# Patient Record
Sex: MALE | Race: WHITE | NOT HISPANIC OR LATINO
[De-identification: names, ages, dates, MRNs, and addresses within clinical notes are randomized per-mention and may not be internally consistent; named-entity substitution may affect disease eponyms.]

---

## 2017-07-16 ENCOUNTER — TRANSCRIPTION ENCOUNTER (OUTPATIENT)
Age: 27
End: 2017-07-16

## 2017-08-16 ENCOUNTER — TRANSCRIPTION ENCOUNTER (OUTPATIENT)
Age: 27
End: 2017-08-16

## 2017-08-25 ENCOUNTER — TRANSCRIPTION ENCOUNTER (OUTPATIENT)
Age: 27
End: 2017-08-25

## 2018-03-01 ENCOUNTER — OUTPATIENT (OUTPATIENT)
Dept: OUTPATIENT SERVICES | Facility: HOSPITAL | Age: 28
LOS: 1 days | End: 2018-03-01

## 2018-03-21 DIAGNOSIS — R69 ILLNESS, UNSPECIFIED: ICD-10-CM

## 2018-05-17 ENCOUNTER — TRANSCRIPTION ENCOUNTER (OUTPATIENT)
Age: 28
End: 2018-05-17

## 2018-05-23 ENCOUNTER — APPOINTMENT (OUTPATIENT)
Dept: NEUROLOGY | Facility: CLINIC | Age: 28
End: 2018-05-23
Payer: MEDICAID

## 2018-05-23 VITALS
DIASTOLIC BLOOD PRESSURE: 78 MMHG | SYSTOLIC BLOOD PRESSURE: 120 MMHG | WEIGHT: 150 LBS | BODY MASS INDEX: 24.11 KG/M2 | HEIGHT: 66 IN

## 2018-05-23 DIAGNOSIS — Z86.59 PERSONAL HISTORY OF OTHER MENTAL AND BEHAVIORAL DISORDERS: ICD-10-CM

## 2018-05-23 DIAGNOSIS — M54.16 RADICULOPATHY, LUMBAR REGION: ICD-10-CM

## 2018-05-23 DIAGNOSIS — M54.12 RADICULOPATHY, CERVICAL REGION: ICD-10-CM

## 2018-05-23 PROCEDURE — 99204 OFFICE O/P NEW MOD 45 MIN: CPT

## 2018-05-23 RX ORDER — DEXTROAMPHETAMINE SACCHARATE, AMPHETAMINE ASPARTATE, DEXTROAMPHETAMINE SULFATE AND AMPHETAMINE SULFATE 7.5; 7.5; 7.5; 7.5 MG/1; MG/1; MG/1; MG/1
30 TABLET ORAL
Qty: 90 | Refills: 0 | Status: ACTIVE | COMMUNITY
Start: 2018-01-20

## 2018-05-23 RX ORDER — ALPRAZOLAM 2 MG/1
2 TABLET ORAL
Qty: 90 | Refills: 0 | Status: ACTIVE | COMMUNITY
Start: 2018-03-01

## 2018-06-01 ENCOUNTER — FORM ENCOUNTER (OUTPATIENT)
Age: 28
End: 2018-06-01

## 2018-06-02 ENCOUNTER — APPOINTMENT (OUTPATIENT)
Dept: MRI IMAGING | Facility: CLINIC | Age: 28
End: 2018-06-02
Payer: MEDICAID

## 2018-06-02 ENCOUNTER — OUTPATIENT (OUTPATIENT)
Dept: OUTPATIENT SERVICES | Facility: HOSPITAL | Age: 28
LOS: 1 days | End: 2018-06-02
Payer: MEDICAID

## 2018-06-02 DIAGNOSIS — M54.12 RADICULOPATHY, CERVICAL REGION: ICD-10-CM

## 2018-06-02 PROCEDURE — 72148 MRI LUMBAR SPINE W/O DYE: CPT

## 2018-06-02 PROCEDURE — 72148 MRI LUMBAR SPINE W/O DYE: CPT | Mod: 26

## 2018-06-02 PROCEDURE — 72141 MRI NECK SPINE W/O DYE: CPT | Mod: 26

## 2018-06-02 PROCEDURE — 72141 MRI NECK SPINE W/O DYE: CPT

## 2018-06-28 ENCOUNTER — APPOINTMENT (OUTPATIENT)
Dept: NEUROLOGY | Facility: CLINIC | Age: 28
End: 2018-06-28
Payer: MEDICAID

## 2018-06-28 PROCEDURE — 95886 MUSC TEST DONE W/N TEST COMP: CPT

## 2018-06-28 PROCEDURE — 95911 NRV CNDJ TEST 9-10 STUDIES: CPT

## 2018-10-01 ENCOUNTER — OUTPATIENT (OUTPATIENT)
Dept: OUTPATIENT SERVICES | Facility: HOSPITAL | Age: 28
LOS: 1 days | End: 2018-10-01
Payer: MEDICAID

## 2018-10-01 DIAGNOSIS — Z76.89 PERSONS ENCOUNTERING HEALTH SERVICES IN OTHER SPECIFIED CIRCUMSTANCES: ICD-10-CM

## 2018-10-08 DIAGNOSIS — Z71.89 OTHER SPECIFIED COUNSELING: ICD-10-CM

## 2019-03-20 ENCOUNTER — EMERGENCY (EMERGENCY)
Facility: HOSPITAL | Age: 29
LOS: 1 days | Discharge: LEFT BEFORE DISCHARGE | End: 2019-03-20
Attending: EMERGENCY MEDICINE
Payer: MEDICAID

## 2019-03-20 VITALS
WEIGHT: 149.91 LBS | HEIGHT: 65 IN | DIASTOLIC BLOOD PRESSURE: 84 MMHG | RESPIRATION RATE: 23 BRPM | TEMPERATURE: 98 F | SYSTOLIC BLOOD PRESSURE: 173 MMHG | HEART RATE: 134 BPM | OXYGEN SATURATION: 97 %

## 2019-03-20 PROCEDURE — 93005 ELECTROCARDIOGRAM TRACING: CPT

## 2019-03-20 PROCEDURE — 93010 ELECTROCARDIOGRAM REPORT: CPT

## 2019-03-20 RX ORDER — SODIUM CHLORIDE 9 MG/ML
1000 INJECTION INTRAMUSCULAR; INTRAVENOUS; SUBCUTANEOUS ONCE
Qty: 0 | Refills: 0 | Status: DISCONTINUED | OUTPATIENT
Start: 2019-03-20 | End: 2019-03-25

## 2019-03-20 NOTE — ED STATDOCS - OBJECTIVE STATEMENT
30 y/o M pt with PMHx anxiety presents to the ED c/o palpitations beginning this morning.  He states he feels anxious, his heart is racing, and he is "freaking out".  Pt's medication was recently changed, he has been taking xanax 2mg TID for the past 3 days, took the last dosage last night at 19:30, and took his first dosage of clonazepam 2mg before breakfast this morning.  He follows up with PMD and psychiatrist, is unsure why is med was changed.  He saw his PMD this morning, was advised to come into the ED for further evaluation.  Denies CP.  No further acute complaints at this time.  Psychiatrist: Dr. Lata Verdugo. PMD: Dr. Hare.  Pt left piror to completion of evaluation, decided to sent pt to the Main ED for cardiac monitoring, pt states he does not want to stay for blood work or for MD to speak to PMD, stating he will "take the risk of dying". 28 y/o M pt with PMHx anxiety presents to the ED c/o palpitations beginning this morning.  He states he feels anxious, his heart is racing, and he is "freaking out".  Pt's medication was recently changed, he has been taking xanax 2mg TID for the past 3 days, took the last dosage last night at 19:30, and took his first dosage of clonazepam 2mg before breakfast this morning.  He follows up with PMD and psychiatrist, saw his PCP for refill, but was changed to klonopin and  is unsure why is med was changed.  He saw his PMD this morning, was advised to come into the ED for further evaluation.  Denies CP.  No further acute complaints at this time.  Psychiatrist: Dr. Lata Verdugo. PMD: Dr. Hare.  Pt advised that due to elevated HR and symptoms, pt would need labs, cardiac monitoring, however he left prior to completion of evaluation, pt states he does not want to stay for blood work or for MD to speak to PMD, only wants renewed prescription of xanax now as he has to go to work,  stating he will "take the risk of dying". Patient walked out.

## 2019-03-20 NOTE — ED ADULT NURSE REASSESSMENT NOTE - NS ED NURSE REASSESS COMMENT FT1
Patient ambulated out of the room and states "if they aren't going to give me something to calm me down then I would rather risk death. I have to go to work." Patient ambulated out of the ED

## 2019-03-20 NOTE — ED ADULT TRIAGE NOTE - CHIEF COMPLAINT QUOTE
Patient states that he woke up this morning with palpitations. Patient states that he took his new medication for anxiety clonazepam but it is not helping. He denies any SOB, chest pain or recent travel. patient states that he is very anxious

## 2019-06-01 PROCEDURE — G9005: CPT

## 2021-04-05 ENCOUNTER — EMERGENCY (EMERGENCY)
Facility: HOSPITAL | Age: 31
LOS: 1 days | Discharge: HOME | End: 2021-04-05
Attending: EMERGENCY MEDICINE
Payer: MEDICAID

## 2021-04-05 VITALS
TEMPERATURE: 98 F | HEART RATE: 87 BPM | DIASTOLIC BLOOD PRESSURE: 83 MMHG | OXYGEN SATURATION: 96 % | RESPIRATION RATE: 18 BRPM | HEIGHT: 65 IN | SYSTOLIC BLOOD PRESSURE: 116 MMHG | WEIGHT: 149.91 LBS

## 2021-04-05 PROCEDURE — 99283 EMERGENCY DEPT VISIT LOW MDM: CPT

## 2021-04-05 PROCEDURE — 99284 EMERGENCY DEPT VISIT MOD MDM: CPT

## 2021-04-05 RX ORDER — ONDANSETRON 8 MG/1
4 TABLET, FILM COATED ORAL ONCE
Refills: 0 | Status: COMPLETED | OUTPATIENT
Start: 2021-04-05 | End: 2021-04-05

## 2021-04-05 RX ORDER — ONDANSETRON 8 MG/1
1 TABLET, FILM COATED ORAL
Qty: 15 | Refills: 0
Start: 2021-04-05 | End: 2021-04-09

## 2021-04-05 RX ADMIN — ONDANSETRON 4 MILLIGRAM(S): 8 TABLET, FILM COATED ORAL at 05:26

## 2021-04-05 NOTE — ED PROVIDER NOTE - CLINICAL SUMMARY MEDICAL DECISION MAKING FREE TEXT BOX
32 y/o M hx of PTSD, General anxiety disorder brought in by EMS from home for epigastric abdominal pain for the past 1 day.   Mild tenderness in epigastric region but abdomen is soft, non-distended, no rebound or guarding. Patient is afebrile, VSS.   Will give 4mg Zofran for nausea   Likely viral infection in nature given afebrile, multiple episodes diarrhea and emesis, non-bloody diarrhea, benign abdominal exam.

## 2021-04-05 NOTE — ED PROVIDER NOTE - ATTENDING CONTRIBUTION TO CARE
I personally saw the patient with the resident, and completed the key components of the history and physical exam. I then discussed the management plan with the resident.   gen in nad resp clear cardiac no murmur abd + ttp epigastric region no g/r/r no cvat neuro intact

## 2021-04-05 NOTE — ED PROVIDER NOTE - NS ED ROS FT
General: Denies fever, chills  HEENT: Denies sensory changes, sore throat  Neck: Denies neck pain, neck stiffness  Resp: Denies coughing, SOB  Cardiovascular: Denies CP, palpitations, LE edema  GI: +nausea, vomiting, abdominal pain, diarrhea. Denies constipation, blood in stool  : Denies dysuria, hematuria, frequency, incontinence  MSK: Denies back pain  Neuro: Denies HA, dizziness, numbness, weakness  Skin: Denies rashes.

## 2021-04-05 NOTE — ED PROVIDER NOTE - OBJECTIVE STATEMENT
32 y/o M hx of PTSD, General anxiety disorder brought in by EMS from home for epigastric abdominal pain for the past 1 day. Patient endorsing epigastric abdominal pain that does not radiate anywhere, pressure/achy in nature, constant. Endorses multiple episodes (aprox 5) of non-bilious, non-bloody emesis and 12 episodes of loose, watery, non-bloody bowel movements. States he has been able to tolerate solids/liquids at home during the day. Denies fever or chills. Denies sick contacts. Denies chest pain or shortness of breath. Denies ilicit drug use.

## 2021-04-05 NOTE — ED ADULT TRIAGE NOTE - CHIEF COMPLAINT QUOTE
patient states that he has abdominal pain nausea, vomiting and diarrhea since yesterday. patient states that it has been increasingly worse.

## 2021-04-05 NOTE — ED PROVIDER NOTE - PATIENT PORTAL LINK FT
You can access the FollowMyHealth Patient Portal offered by [facility] by registering at the following website: [email or web address]. By joining [employer]’s FollowMyHealth portal, you will also be able to view your health information using other applications (apps) compatible with our system.

## 2021-04-05 NOTE — ED PROVIDER NOTE - NSFOLLOWUPINSTRUCTIONS_ED_ALL_ED_FT
Take zofran up to 3 times per day (every 8 hours) as needed for nausea/vomiting.     Nausea / Vomiting    Nausea is the feeling that you have to vomit. As nausea gets worse, it can lead to vomiting. Vomiting puts you at an increased risk for dehydration. Older adults and people with other diseases or a weak immune system are at higher risk for dehydration. Drink clear fluids in small but frequent amounts as tolerated. Eat bland, easy-to-digest foods in small amounts as tolerated.    SEEK IMMEDIATE MEDICAL CARE IF YOU HAVE ANY OF THE FOLLOWING SYMPTOMS: fever, inability to keep sufficient fluids down, black or bloody vomitus, black or bloody stools, lightheadedness/dizziness, chest pain, severe headache, rash, shortness of breath, cold or clammy skin, confusion, pain with urination, or any signs of dehydration.

## 2023-03-14 ENCOUNTER — NON-APPOINTMENT (OUTPATIENT)
Age: 33
End: 2023-03-14

## 2023-08-01 ENCOUNTER — EMERGENCY (EMERGENCY)
Facility: HOSPITAL | Age: 33
LOS: 1 days | Discharge: HOME | End: 2023-08-01
Attending: EMERGENCY MEDICINE
Payer: MEDICAID

## 2023-08-01 VITALS
TEMPERATURE: 98 F | OXYGEN SATURATION: 99 % | WEIGHT: 134.92 LBS | SYSTOLIC BLOOD PRESSURE: 116 MMHG | RESPIRATION RATE: 16 BRPM | DIASTOLIC BLOOD PRESSURE: 62 MMHG | HEIGHT: 66 IN | HEART RATE: 88 BPM

## 2023-08-01 DIAGNOSIS — F39 UNSPECIFIED MOOD [AFFECTIVE] DISORDER: ICD-10-CM

## 2023-08-01 LAB
ANION GAP SERPL CALC-SCNC: 16 MMOL/L — SIGNIFICANT CHANGE UP (ref 5–17)
APAP SERPL-MCNC: <3 UG/ML — LOW (ref 10–26)
BASOPHILS # BLD AUTO: 0.1 K/UL — SIGNIFICANT CHANGE UP (ref 0–0.2)
BASOPHILS NFR BLD AUTO: 0.8 % — SIGNIFICANT CHANGE UP (ref 0–2)
BUN SERPL-MCNC: 21.6 MG/DL — HIGH (ref 8–20)
CALCIUM SERPL-MCNC: 9.8 MG/DL — SIGNIFICANT CHANGE UP (ref 8.4–10.5)
CHLORIDE SERPL-SCNC: 100 MMOL/L — SIGNIFICANT CHANGE UP (ref 96–108)
CO2 SERPL-SCNC: 24 MMOL/L — SIGNIFICANT CHANGE UP (ref 22–29)
CREAT SERPL-MCNC: 1.2 MG/DL — SIGNIFICANT CHANGE UP (ref 0.5–1.3)
EGFR: 83 ML/MIN/1.73M2 — SIGNIFICANT CHANGE UP
EOSINOPHIL # BLD AUTO: 0.19 K/UL — SIGNIFICANT CHANGE UP (ref 0–0.5)
EOSINOPHIL NFR BLD AUTO: 1.6 % — SIGNIFICANT CHANGE UP (ref 0–6)
ETHANOL SERPL-MCNC: <10 MG/DL — SIGNIFICANT CHANGE UP (ref 0–9)
GLUCOSE SERPL-MCNC: 73 MG/DL — SIGNIFICANT CHANGE UP (ref 70–99)
HCT VFR BLD CALC: 44.1 % — SIGNIFICANT CHANGE UP (ref 39–50)
HGB BLD-MCNC: 16.2 G/DL — SIGNIFICANT CHANGE UP (ref 13–17)
IMM GRANULOCYTES NFR BLD AUTO: 0.3 % — SIGNIFICANT CHANGE UP (ref 0–0.9)
LYMPHOCYTES # BLD AUTO: 1.95 K/UL — SIGNIFICANT CHANGE UP (ref 1–3.3)
LYMPHOCYTES # BLD AUTO: 16.4 % — SIGNIFICANT CHANGE UP (ref 13–44)
MCHC RBC-ENTMCNC: 30.1 PG — SIGNIFICANT CHANGE UP (ref 27–34)
MCHC RBC-ENTMCNC: 36.7 GM/DL — HIGH (ref 32–36)
MCV RBC AUTO: 81.8 FL — SIGNIFICANT CHANGE UP (ref 80–100)
MONOCYTES # BLD AUTO: 1.15 K/UL — HIGH (ref 0–0.9)
MONOCYTES NFR BLD AUTO: 9.6 % — SIGNIFICANT CHANGE UP (ref 2–14)
NEUTROPHILS # BLD AUTO: 8.49 K/UL — HIGH (ref 1.8–7.4)
NEUTROPHILS NFR BLD AUTO: 71.3 % — SIGNIFICANT CHANGE UP (ref 43–77)
PLATELET # BLD AUTO: 240 K/UL — SIGNIFICANT CHANGE UP (ref 150–400)
POTASSIUM SERPL-MCNC: 4 MMOL/L — SIGNIFICANT CHANGE UP (ref 3.5–5.3)
POTASSIUM SERPL-SCNC: 4 MMOL/L — SIGNIFICANT CHANGE UP (ref 3.5–5.3)
RBC # BLD: 5.39 M/UL — SIGNIFICANT CHANGE UP (ref 4.2–5.8)
RBC # FLD: 11.8 % — SIGNIFICANT CHANGE UP (ref 10.3–14.5)
SALICYLATES SERPL-MCNC: <0.6 MG/DL — LOW (ref 10–20)
SODIUM SERPL-SCNC: 140 MMOL/L — SIGNIFICANT CHANGE UP (ref 135–145)
WBC # BLD: 11.92 K/UL — HIGH (ref 3.8–10.5)
WBC # FLD AUTO: 11.92 K/UL — HIGH (ref 3.8–10.5)

## 2023-08-01 PROCEDURE — 99285 EMERGENCY DEPT VISIT HI MDM: CPT

## 2023-08-01 PROCEDURE — 93010 ELECTROCARDIOGRAM REPORT: CPT

## 2023-08-01 NOTE — ED BEHAVIORAL HEALTH ASSESSMENT NOTE - DESCRIPTION
Domiciled with parents No stat meds or acute events.  ICU Vital Signs Last 24 Hrs  T(C): 36.7 (01 Aug 2023 19:45), Max: 36.7 (01 Aug 2023 17:28)  T(F): 98 (01 Aug 2023 19:45), Max: 98 (01 Aug 2023 17:28)  HR: 94 (01 Aug 2023 19:45) (88 - 94)  BP: 120/76 (01 Aug 2023 19:45) (116/62 - 120/76)  BP(mean): --  ABP: --  ABP(mean): --  RR: 18 (01 Aug 2023 19:45) (16 - 18)  SpO2: 98% (01 Aug 2023 19:45) (98% - 99%)    O2 Parameters below as of 01 Aug 2023 19:45  Patient On (Oxygen Delivery Method): room air unknown

## 2023-08-01 NOTE — ED BEHAVIORAL HEALTH ASSESSMENT NOTE - SUMMARY
33 year old, male; domiciled with parents; single; noncaregiver; reported past psychiatric history of PTSD, TOLU, schizophrenia; unknown number of psychiatric hospitalizations; no known suicide attempts; no known history of violence or arrests; history of Xanax and Adderall use; no PMH; brought in by PD; called by parents; in the setting of reported SI and paranoia; pt reportedly threatening to cut his own throat. Per triage note, pt was reportedly extremely erratic in the home and expressing SI according to PD. Of note, upon arrival pt present with $9700 on his person and 2 phones; pt reportedly taking Xanax and Adderall.    Writer attempted to assess pt this evening however pt was found to be sleeping, minimally responsive and unable to participate in a meaningful assessment with writer. Pt will be held for re-assessment in the morning.

## 2023-08-01 NOTE — ED ADULT TRIAGE NOTE - CHIEF COMPLAINT QUOTE
911 call from parents for paranoia and erratic behavior in the home. patient taking xanax and Adderall. in triage patient calm and cooperative, states he just wants to listen to his headphones at home and his parents bother him, A&Ox4, denies any active SI/HI. PD accompanied patient #[id number] who states patient was extremely erratic in the home and was making suicidal statements

## 2023-08-01 NOTE — ED ADULT NURSE NOTE - CHIEF COMPLAINT QUOTE
911 call from parents for paranoia and erratic behavior in the home. patient taking xanax and Adderall. in triage patient calm and cooperative, states he just wants to listen to his headphones at home and his parents bother him, A&Ox4, denies any active SI/HI. PD accompanied patient #[id number] who states patient was extremely erratic in the home and was making suicidal statements

## 2023-08-01 NOTE — ED BEHAVIORAL HEALTH ASSESSMENT NOTE - HPI (INCLUDE ILLNESS QUALITY, SEVERITY, DURATION, TIMING, CONTEXT, MODIFYING FACTORS, ASSOCIATED SIGNS AND SYMPTOMS)
Patient is a 33 year old, male; domiciled with parents; single; noncaregiver; reported past psychiatric history of PTSD, TOLU, schizophrenia; unknown number of psychiatric hospitalizations; no known suicide attempts; no known history of violence or arrests; history of Xanax and Adderall use; no PMH; brought in by PD; called by parents; in the setting of reported SI and paranoia; pt reportedly threatening to cut his own throat. Per triage note, pt was reportedly extremely erratic in the home and expressing SI according to PD. Of note, upon arrival pt present with $9700 on his person and 2 phones; pt reportedly taking Xanax and Adderall.     Writer attempted to assess pt this evening however pt was found to be sleeping, minimally responsive and unable to participate in a meaningful assessment with writer.

## 2023-08-01 NOTE — ED ADULT NURSE NOTE - OBJECTIVE STATEMENT
Patient ambulate to ED in yellow gown. Patient medically cleared by attending from main ED, wand by security and compliant with process, patient alert and oriented, report history of ADHD, anxiety and schizophrenia. Patient was assigned to a room, awaiting psych eval.

## 2023-08-01 NOTE — ED PROVIDER NOTE - OBJECTIVE STATEMENT
34 y/o M pt BIB EMS/PD for SI. Pt's parents called police stating that pt was threatening to cut his own throat. In ED pt states his parents are lying. He is tangential and anxious but denies SI/HI, hallucinations, or any other complaints.

## 2023-08-01 NOTE — ED PROVIDER NOTE - ATTENDING CONTRIBUTION TO CARE
33 M history of anxiety, previous SI presenting after claiming to have a panic attack when he could not find his phone.  According to police report states patient threatened to slit his neck to his parents.  Currently patient denies any SI/HI, feels much better and does not want to harm himself or others.  Denies any hallucinations or delusions or any drug use/EtOH use.    General no acute distress, calm  Respiratory nonlabored breathing  Cards RRR  Psych no agitation, directable and cooperative    Currently patient does not appear to be a threat to himself however given the events as stated under police report will require psychiatric clearance.  Labs ordered, one-to-one instated and will be moved to .

## 2023-08-01 NOTE — ED PROVIDER NOTE - NS ED ATTENDING STATEMENT MOD
I have seen and examined this patient and fully participated in the care of this patient as the teaching attending.  The service was shared with the NOHEMI.  I reviewed and verified the documentation and independently performed the documented:

## 2023-08-01 NOTE — ED PROVIDER NOTE - PHYSICAL EXAMINATION
Gen: anxious appearing  Head: NC/AT, pupils 5mm b/l  Neck: trachea midline  Card: regular rate and rhythm  Resp:  CTAB  Abd: soft, non-distended, non-tender  Ext: no deformities above reported baseline  Neuro:  A&O, no motor or sensory deficits above reported baseline  Skin:  Warm and dry as visualized  Psych:  Anxious

## 2023-08-02 VITALS
TEMPERATURE: 98 F | DIASTOLIC BLOOD PRESSURE: 96 MMHG | HEART RATE: 86 BPM | RESPIRATION RATE: 18 BRPM | OXYGEN SATURATION: 97 % | SYSTOLIC BLOOD PRESSURE: 127 MMHG

## 2023-08-02 DIAGNOSIS — F41.9 ANXIETY DISORDER, UNSPECIFIED: ICD-10-CM

## 2023-08-02 LAB
AMPHET UR-MCNC: POSITIVE
APPEARANCE UR: CLEAR — SIGNIFICANT CHANGE UP
BARBITURATES UR SCN-MCNC: NEGATIVE — SIGNIFICANT CHANGE UP
BENZODIAZ UR-MCNC: POSITIVE
BILIRUB UR-MCNC: NEGATIVE — SIGNIFICANT CHANGE UP
COCAINE METAB.OTHER UR-MCNC: NEGATIVE — SIGNIFICANT CHANGE UP
COLOR SPEC: YELLOW — SIGNIFICANT CHANGE UP
DIFF PNL FLD: NEGATIVE — SIGNIFICANT CHANGE UP
EPI CELLS # UR: SIGNIFICANT CHANGE UP
GLUCOSE UR QL: NEGATIVE MG/DL — SIGNIFICANT CHANGE UP
KETONES UR-MCNC: ABNORMAL
LEUKOCYTE ESTERASE UR-ACNC: NEGATIVE — SIGNIFICANT CHANGE UP
METHADONE UR-MCNC: NEGATIVE — SIGNIFICANT CHANGE UP
NITRITE UR-MCNC: NEGATIVE — SIGNIFICANT CHANGE UP
OPIATES UR-MCNC: NEGATIVE — SIGNIFICANT CHANGE UP
PCP SPEC-MCNC: SIGNIFICANT CHANGE UP
PCP UR-MCNC: NEGATIVE — SIGNIFICANT CHANGE UP
PH UR: 6 — SIGNIFICANT CHANGE UP (ref 5–8)
PROT UR-MCNC: 30 MG/DL
RBC CASTS # UR COMP ASSIST: NEGATIVE /HPF — SIGNIFICANT CHANGE UP (ref 0–4)
SARS-COV-2 RNA SPEC QL NAA+PROBE: SIGNIFICANT CHANGE UP
SP GR SPEC: 1.02 — SIGNIFICANT CHANGE UP (ref 1.01–1.02)
THC UR QL: POSITIVE
UROBILINOGEN FLD QL: 1 MG/DL
WBC UR QL: NEGATIVE /HPF — SIGNIFICANT CHANGE UP (ref 0–5)

## 2023-08-02 PROCEDURE — 87635 SARS-COV-2 COVID-19 AMP PRB: CPT

## 2023-08-02 PROCEDURE — 99213 OFFICE O/P EST LOW 20 MIN: CPT

## 2023-08-02 PROCEDURE — G0378: CPT

## 2023-08-02 PROCEDURE — 93005 ELECTROCARDIOGRAM TRACING: CPT

## 2023-08-02 PROCEDURE — 99223 1ST HOSP IP/OBS HIGH 75: CPT

## 2023-08-02 PROCEDURE — 85025 COMPLETE CBC W/AUTO DIFF WBC: CPT

## 2023-08-02 PROCEDURE — 81001 URINALYSIS AUTO W/SCOPE: CPT

## 2023-08-02 PROCEDURE — 99285 EMERGENCY DEPT VISIT HI MDM: CPT | Mod: 25

## 2023-08-02 PROCEDURE — 80048 BASIC METABOLIC PNL TOTAL CA: CPT

## 2023-08-02 PROCEDURE — 36415 COLL VENOUS BLD VENIPUNCTURE: CPT

## 2023-08-02 PROCEDURE — 80307 DRUG TEST PRSMV CHEM ANLYZR: CPT

## 2023-08-02 RX ORDER — DEXTROAMPHETAMINE SACCHARATE, AMPHETAMINE ASPARTATE, DEXTROAMPHETAMINE SULFATE AND AMPHETAMINE SULFATE 1.875; 1.875; 1.875; 1.875 MG/1; MG/1; MG/1; MG/1
30 TABLET ORAL ONCE
Refills: 0 | Status: DISCONTINUED | OUTPATIENT
Start: 2023-08-02 | End: 2023-08-02

## 2023-08-02 RX ORDER — ALPRAZOLAM 0.25 MG
1 TABLET ORAL ONCE
Refills: 0 | Status: DISCONTINUED | OUTPATIENT
Start: 2023-08-02 | End: 2023-08-02

## 2023-08-02 RX ADMIN — Medication 1 MILLIGRAM(S): at 09:52

## 2023-08-02 RX ADMIN — DEXTROAMPHETAMINE SACCHARATE, AMPHETAMINE ASPARTATE, DEXTROAMPHETAMINE SULFATE AND AMPHETAMINE SULFATE 30 MILLIGRAM(S): 1.875; 1.875; 1.875; 1.875 TABLET ORAL at 09:52

## 2023-08-02 NOTE — ED ADULT NURSE REASSESSMENT NOTE - NSFALLUNIVINTERV_ED_ALL_ED
Monitor gait and stability/Monitor for mental status changes and reorient to person, place, and time, as needed/Physically safe environment - no spills, clutter or unnecessary equipment

## 2023-08-02 NOTE — ED ADULT NURSE REASSESSMENT NOTE - NS ED NURSE REASSESS COMMENT FT1
Patient in bed sleeping, safety maintained.
Patient in bed sleeping, safety maintained.
Patient slept through the night, no complain voiced, Covid specimen collected, all needs attend, safety maintained.
belongings secured and listed in front of patient and two RNs. 9,710 dollars, 2 iPhones, and one wallet with ID. brought to security. red dot on bracelet
pt is d/C belonging picked up from security, RN Chitra and nurse assistant Mehdi sanchez pt count money $9710and belongings 2 iphones and 1 wallet pt agreed all belonging returned and safety maintained d/c papers giving
pt is interacting calm and cooperatively. pt denies suicidal or homicidal ideations and auditory or visual hallucinations. pt was evaluated by dr Vargas and cleared to be discharged home. The pt endorses this plan.
sleeping with no acute distress noted.

## 2023-08-02 NOTE — ED CDU PROVIDER DISPOSITION NOTE - PATIENT PORTAL LINK FT
You can access the FollowMyHealth Patient Portal offered by [facility] by registering at the following website: [email or web address]. By joining [employer]’s FollowMyHealth portal, you will also be able to view your health information using other applications (apps) compatible with our system.

## 2023-08-02 NOTE — ED BEHAVIORAL HEALTH PROGRESS NOTE - STANDING MEDS RECEIVED IN ED DETAILS FREE TEXT
one time orders placed for home psychotropic medications    A	Y	B	07/13/2023	07/13/2023	alprazolam 2 mg tablet	60	30	Basim Hayden	[id number]	Insurance	[facility]  A	Y	S	07/13/2023	07/13/2023	dextroamp-amphetamin 30 mg tab	90	30	Basim Hayden	[id number]	Insurance	[facility]

## 2023-08-02 NOTE — ED BEHAVIORAL HEALTH PROGRESS NOTE - DETAILS
N/A Advised patient to go to nearest ER or call 911, for any of the followin) agitation aggression or anxiety, 2) suicidal or homicidal thoughts 3) worsening of symptoms or 4) side effects of medications family aware of discharge

## 2023-08-02 NOTE — ED BEHAVIORAL HEALTH PROGRESS NOTE - CASE SUMMARY/FORMULATION (CLEARLY DOCUMENT RATIONALE FOR DISPOSITION CHANGE)
Although the patient’s possible statements of self harm, homelessness, male gender, chronic mental health issues, and substance use – all place him at risk of future self harm, the patient currently denies any suicidal/homicidal ideations plan or intent, is future oriented, forward looking, able to cite reasons for continued living, with stated obligations to family and friends, expresses an interest in outpatient treatment, and has no prior suicide attempts.  Therefore, although the patient has an elevated baseline risk of self harm, acutely the patient is at low risk of self harm. Patient denies any acute psychiatric issues concerns or complaints. The patient has declined voluntary admission and does not meet criteria for involuntary commitment to an acute inpatient psychiatric unit. Combined psychopharmacology and outpatient psychotherapy are the primary treatment modalities that are most likely to assist the patient in developing more productive means of managing his mental health conditions and navigating interpersonal relationships/substance use, rather than an inpatient psychiatric admission. Declines substance rehab resources.

## 2023-08-02 NOTE — ED CDU PROVIDER DISPOSITION NOTE - CLINICAL COURSE
33 year old, male; domiciled with parents; single; noncaregiver; reported past psychiatric history of PTSD, TOLU, schizophrenia; unknown number of psychiatric hospitalizations; no known suicide attempts; no known history of violence or arrests; history of Xanax and Adderall use; no PMH; brought in by PD; called by parents; in the setting of reported SI and paranoia; pt reportedly threatening to cut his own throat. Per triage note, pt was reportedly extremely erratic in the home and expressing SI according to PD. Of note, upon arrival pt present with $9700 on his person and 2 phones; pt reportedly taking Xanax and Adderall.  Seen by  and cleared for discharge.

## 2023-08-02 NOTE — ED CDU PROVIDER INITIAL DAY NOTE - OBJECTIVE STATEMENT
32 y/o M pt BIB EMS/PD for SI. Pt's parents called police stating that pt was threatening to cut his own throat. In ED pt states his parents are lying. He is tangential and anxious but denies SI/HI, hallucinations, or any other complaints.

## 2023-08-02 NOTE — ED CDU PROVIDER DISPOSITION NOTE - NSFOLLOWUPINSTRUCTIONS_ED_ALL_ED_FT
You are advised to please follow up with your primary care doctor within the next 24 hours and return to the Emergency Department for worsening symptoms or any other concerns.  Your doctor may call [phone] to follow up on the specific results of the tests performed today in the emergency department.    [facility] [address] [address]  [phone]    Depression    Depression is a mental illness that usually causes feelings of sadness, hopelessness, or helplessness. Some people with this disorder do not feel particularly sad but lose interest in doing things they used to enjoy. Major depressive disorder also can cause physical symptoms. It can interfere with work, school, relationships, and other normal everyday activities. If you were started on a medication, make sure to take exactly as prescribed and follow up with a psychiatrist.    SEEK IMMEDIATE MEDICAL CARE IF YOU HAVE ANY OF THE FOLLOWING SYMPTOMS: thoughts about hurting or killing yourself, thoughts about hurting or killing somebody else, hallucinations, or worsening depression.

## 2023-08-02 NOTE — ED BEHAVIORAL HEALTH PROGRESS NOTE - RISK MITIGATION STRATEGIES IMPLEMENTED DETAILS FREE TEXT
allowed time to achieve sobriety (UDS+ benzo, prescribed alprazolam, as well as cannabis, and amphetamine, prescribed adderall)

## 2023-08-02 NOTE — CHART NOTE - NSCHARTNOTEFT_GEN_A_CORE
SW Note: As per psychiatrist, pt is cleared psychiatrically for discharge with outpt f/u with [facility]. Pt reports he is interested in [facility] referral. [facility] referral made for friday 8/4 at 2:30 PM. Family service league brochure and DASH hotline information provided. Pt reports he will call his father to pick him up upon discharge. Pt aware if symptoms worsen to return to emergency room or call 9/11. No further psychosocial needs identified at this time.

## 2023-08-02 NOTE — ED BEHAVIORAL HEALTH PROGRESS NOTE - RISK ASSESSMENT
RF: Substance use, recent SI, recent [id number] behavior  PF: Domiciled with parents RF: Substance use, anxiety    PF: Domiciled with parents  engaged in treatment  motivated  able to cite reasons for continued living  states he was never suicidal  no prior suicide attempts

## 2023-08-02 NOTE — ED BEHAVIORAL HEALTH PROGRESS NOTE - DETAILS:
Evaluated for follow up. Calm cooperative engaged and pleasant throughout encounter. Appears comfortable, linear throughout encounter. Alert and oriented to person place time and situation. Reports feeling better and more relaxed, requesting discharge with outpatient follow up. Denies any suicidal/homicidal ideations plan or intent. Denies any significant affective or psychotic symptoms. Denies any recent lasting period of negative mood or anhedonia. Denies any recent lasting period of elevated or irritable mood with increased goal directed activity/behavior/energy. Denies any recent perceptual disturbances, no overt delusions elicited. Does not appear distressed dysphoric or overtly manic/psychotic. Denies any recent symptoms of significant alcohol or other substance intoxication/withdrawal. Future oriented, forward looking, able to cite reasons for continued living.  Clarified events leading up to presentation. States that he was at home alone yesterday as per usual. However, around 2PM was unable to find his mobile phone which prompted a panic attack. Adds that he frequently experiences panic attacks several times a day; ameliorated by his alprazolam. [facility] database indicates following: A	Y	B	07/13/2023	07/13/2023	alprazolam 2 mg tablet	60	30	Basim Hayden	[id number]	Insurance	[facility]  A	Y	S	07/13/2023	07/13/2023	dextroamp-amphetamin 30 mg tab	90	30	Basim Hayden	[id number]	Insurance	[facility].  States that he is unsure of his diagnoses though believe them to be anxiety and panic attacks. Last taken them yesterday. Further adds that he often splits his alprazolam into 4 separate, equal doses, with admins throughout the day. Endorses that he does not abuse or overuse his benzodiazepines or psychostimulants.  While having a panic attack in search of his mobile phone, his parents arrived home and were worried. He says that his parents stated he said he was going to harm himself, though patient adamantly denies. States that he has no prior suicide attempts or self injurious behavior. Declines rehab resources.    Collateral information obtained from parents, Hermilo and Otilia [phone]  Confirm patient's account of events leading up to presentation. Adds that he was indeed looking for a missing mobile phone and was in a panic attack. Parents are concerned for possible substance abuse. They believe that he may be abusing his medications, adding that he has a history of substance abuse. Believe that he would benefit from substance rehab. Discussed with family that rehab resources/referral have been discussed with patient (patient declined) and that he would be discharged back home.

## 2023-10-23 ENCOUNTER — EMERGENCY (EMERGENCY)
Facility: HOSPITAL | Age: 33
LOS: 1 days | Discharge: HOME | End: 2023-10-23
Attending: STUDENT IN AN ORGANIZED HEALTH CARE EDUCATION/TRAINING PROGRAM
Payer: MEDICAID

## 2023-10-23 VITALS
WEIGHT: 139.99 LBS | HEART RATE: 57 BPM | RESPIRATION RATE: 20 BRPM | HEIGHT: 66 IN | DIASTOLIC BLOOD PRESSURE: 66 MMHG | TEMPERATURE: 98 F | SYSTOLIC BLOOD PRESSURE: 106 MMHG

## 2023-10-23 VITALS
OXYGEN SATURATION: 97 % | TEMPERATURE: 97 F | DIASTOLIC BLOOD PRESSURE: 58 MMHG | RESPIRATION RATE: 20 BRPM | HEART RATE: 64 BPM | SYSTOLIC BLOOD PRESSURE: 103 MMHG

## 2023-10-23 PROCEDURE — 93005 ELECTROCARDIOGRAM TRACING: CPT

## 2023-10-23 PROCEDURE — 99283 EMERGENCY DEPT VISIT LOW MDM: CPT | Mod: 25

## 2023-10-23 PROCEDURE — 99284 EMERGENCY DEPT VISIT MOD MDM: CPT

## 2023-10-23 PROCEDURE — 93010 ELECTROCARDIOGRAM REPORT: CPT

## 2023-10-23 NOTE — ED PROVIDER NOTE - PATIENT PORTAL LINK FT
You can access the FollowMyHealth Patient Portal offered by [facility] by registering at the following website: [email or web address]. By joining [employer]’s FollowMyHealth portal, you will also be able to view your health information using other applications (apps) compatible with our system.

## 2023-10-23 NOTE — ED PROVIDER NOTE - CLINICAL SUMMARY MEDICAL DECISION MAKING FREE TEXT BOX
32 yo male with hx of ADHD and anxiety disorder presents for the request of having his medications adjusted. Informed patient that he would need to be evaluated by the telepsychiatry service for possible medication adjustment and establishment with out-patient program. Patient is unsure and wants to think about it. Patient without significant complaints or concerns that he is a danger to self or others. Labs and EKG ordered. 34 yo male with hx of ADHD and anxiety disorder presents for the request of having his medications adjusted. Informed patient that he would need to be evaluated by the telepsychiatry service for possible medication adjustment and establishment with out-patient program. Patient is unsure and wants to think about it. Patient without significant complaints or concerns that he is a danger to self or others. Labs and EKG ordered.    Patient declines remaining in the ED for further discussion with psychiatric team.

## 2023-10-23 NOTE — ED PROVIDER NOTE - NSFOLLOWUPINSTRUCTIONS_ED_ALL_ED_FT
Managing Anxiety, Adult    After being diagnosed with an anxiety disorder, you may be relieved to know why you have felt or behaved a certain way. You may also feel overwhelmed about the treatment ahead and what it will mean for your life. With care and support, you can manage this condition and recover from it.    How to manage lifestyle changes      Managing stress and anxiety     Stress is your body's reaction to life changes and events, both good and bad. Most stress will last just a few hours, but stress can be ongoing and can lead to more than just stress. Although stress can play a major role in anxiety, it is not the same as anxiety. Stress is usually caused by something external, such as a deadline, test, or competition. Stress normally passes after the triggering event has ended.     Anxiety is caused by something internal, such as imagining a terrible outcome or worrying that something will go wrong that will devastate you. Anxiety often does not go away even after the triggering event is over, and it can become long-term (chronic) worry. It is important to understand the differences between stress and anxiety and to manage your stress effectively so that it does not lead to an anxious response.    Talk with your health care provider or a counselor to learn more about reducing anxiety and stress. He or she may suggest tension reduction techniques, such as:    Music therapy. This can include creating or listening to music that you enjoy and that inspires you.  Mindfulness-based meditation. This involves being aware of your normal breaths while not trying to control your breathing. It can be done while sitting or walking.  Centering prayer. This involves focusing on a word, phrase, or sacred image that means something to you and brings you peace.  Deep breathing. To do this, expand your stomach and inhale slowly through your nose. Hold your breath for 3–5 seconds. Then exhale slowly, letting your stomach muscles relax.  Self-talk. This involves identifying thought patterns that lead to anxiety reactions and changing those patterns.  Muscle relaxation. This involves tensing muscles and then relaxing them.    Choose a tension reduction technique that suits your lifestyle and personality. These techniques take time and practice. Set aside 5–15 minutes a day to do them. Therapists can offer counseling and training in these techniques. The training to help with anxiety may be covered by some insurance plans. Other things you can do to manage stress and anxiety include:    Keeping a stress/anxiety diary. This can help you learn what triggers your reaction and then learn ways to manage your response.  Thinking about how you react to certain situations. You may not be able to control everything, but you can control your response.  Making time for activities that help you relax and not feeling guilty about spending your time in this way.  Visual imagery and yoga can help you stay calm and relax.        Medicines    Medicines can help ease symptoms. Medicines for anxiety include:    Anti-anxiety drugs.  Antidepressants.    Medicines are often used as a primary treatment for anxiety disorder. Medicines will be prescribed by a health care provider. When used together, medicines, psychotherapy, and tension reduction techniques may be the most effective treatment.        Relationships    Relationships can play a big part in helping you recover. Try to spend more time connecting with trusted friends and family members. Consider going to couples counseling, taking family education classes, or going to family therapy. Therapy can help you and others better understand your condition.    How to recognize changes in your anxiety  Everyone responds differently to treatment for anxiety. Recovery from anxiety happens when symptoms decrease and stop interfering with your daily activities at home or work. This may mean that you will start to:    Have better concentration and focus. Worry will interfere less in your daily thinking.  Sleep better.  Be less irritable.  Have more energy.  Have improved memory.    It is important to recognize when your condition is getting worse. Contact your health care provider if your symptoms interfere with home or work and you feel like your condition is not improving.    Follow these instructions at home:      Activity    Exercise. Most adults should do the following:    Exercise for at least 150 minutes each week. The exercise should increase your heart rate and make you sweat (moderate-intensity exercise).  Strengthening exercises at least twice a week.  Get the right amount and quality of sleep. Most adults need 7–9 hours of sleep each night.        Lifestyle     Eat a healthy diet that includes plenty of vegetables, fruits, whole grains, low-fat dairy products, and lean protein. Do not eat a lot of foods that are high in solid fats, added sugars, or salt.  Make choices that simplify your life.  Do not use any products that contain nicotine or tobacco, such as cigarettes, e-cigarettes, and chewing tobacco. If you need help quitting, ask your health care provider.  Avoid caffeine, alcohol, and certain over-the-counter cold medicines. These may make you feel worse. Ask your pharmacist which medicines to avoid.        General instructions    Take over-the-counter and prescription medicines only as told by your health care provider.  Keep all follow-up visits as told by your health care provider. This is important.    Where to find support  You can get help and support from these sources:    Self-help groups.  Online and community organizations.  A trusted spiritual leader.  Couples counseling.  Family education classes.  Family therapy.    Where to find more information  You may find that joining a support group helps you deal with your anxiety. The following sources can help you locate counselors or support groups near you:    [facility] [place]: www.mentalhealth[place].net  Anxiety and [employer] Association of [place] (ADAA): www.adaa.org  National [place] [employer] ([employer]): www.tj.org    Contact a health care provider if you:  Have a hard time staying focused or finishing daily tasks.  Spend many hours a day feeling worried about everyday life.  Become exhausted by worry.  Start to have headaches, feel tense, or have nausea.  Urinate more than normal.  Have diarrhea.    Get help right away if you have:  A racing heart and shortness of breath.  Thoughts of hurting yourself or others.    If you ever feel like you may hurt yourself or others, or have thoughts about taking your own life, get help right away. You can go to your nearest emergency department or call:     Your local emergency services (911 in the [place]  A suicide crisis helpline, such as the National Suicide Prevention Lifeline at 1-610.378.3005. This is open 24 hours a day.    Summary  Taking steps to learn and use tension reduction techniques can help calm you and help prevent triggering an anxiety reaction.  When used together, medicines, psychotherapy, and tension reduction techniques may be the most effective treatment.  Family, friends, and partners can play a big part in helping you recover from an anxiety disorder.    ADDITIONAL NOTES AND INSTRUCTIONS    Please follow up with your Primary MD in 24-48 hr.  Seek immediate medical care for any new/worsening signs or symptoms.

## 2023-10-23 NOTE — ED PROVIDER NOTE - RESPIRATORY, MLM
Spoke with mom. Notes worsening symptoms including green nasal discharge, worsening cough and low grade temps. She is unable to bring him into clinic at this time. Will start treatment with amoxicillin and recheck in 3-5 days.   Jessica Luke, DO
Breath sounds clear and equal bilaterally.

## 2023-10-23 NOTE — ED ADULT TRIAGE NOTE - CHIEF COMPLAINT QUOTE
pt BIBA c/o being sleep deprived and having abdominal/ankle pain, the [place] was found [place] the [place] [employer] [place], pt denies any alcohol/drugs, pt states that he only takes his prescribed medications, pt has a history of psych

## 2023-10-23 NOTE — ED PROVIDER NOTE - OBJECTIVE STATEMENT
32 yo male with hx of ADHD and anxiety disorder presents for the request of having his medications adjusted. Patient states that his home situation is a little stressful and he believes that this is hindering the improvement of his mental health. He does endorse that he was recommended to follow-up with [facility] but didn't. due to past association with them when he was on parole seal years ago. Patient currently on xanax 2mg tid, and states he doesn't want to go up on th dose but feels he might have too. He was hoping to get established with a out-patient program to work in conjunction with her medications. Patient denies fever, chills, nausea, vomiting, SI, HI, or hallucinations.

## 2023-11-12 ENCOUNTER — EMERGENCY (EMERGENCY)
Facility: HOSPITAL | Age: 33
LOS: 1 days | Discharge: HOME | End: 2023-11-12
Attending: EMERGENCY MEDICINE
Payer: MEDICAID

## 2023-11-12 VITALS
HEART RATE: 82 BPM | DIASTOLIC BLOOD PRESSURE: 82 MMHG | TEMPERATURE: 98 F | OXYGEN SATURATION: 100 % | HEIGHT: 66 IN | RESPIRATION RATE: 18 BRPM | SYSTOLIC BLOOD PRESSURE: 118 MMHG | WEIGHT: 134.92 LBS

## 2023-11-12 PROBLEM — F41.9 ANXIETY DISORDER, UNSPECIFIED: Chronic | Status: ACTIVE | Noted: 2023-10-23

## 2023-11-12 PROBLEM — F90.9 ATTENTION-DEFICIT HYPERACTIVITY DISORDER, UNSPECIFIED TYPE: Chronic | Status: ACTIVE | Noted: 2023-10-23

## 2023-11-12 PROCEDURE — 99283 EMERGENCY DEPT VISIT LOW MDM: CPT

## 2023-11-12 RX ORDER — DEXTROAMPHETAMINE SACCHARATE, AMPHETAMINE ASPARTATE, DEXTROAMPHETAMINE SULFATE AND AMPHETAMINE SULFATE 1.875; 1.875; 1.875; 1.875 MG/1; MG/1; MG/1; MG/1
30 TABLET ORAL
Refills: 0 | Status: DISCONTINUED | OUTPATIENT
Start: 2023-11-12 | End: 2023-11-12

## 2023-11-12 RX ORDER — DEXTROAMPHETAMINE SACCHARATE, AMPHETAMINE ASPARTATE, DEXTROAMPHETAMINE SULFATE AND AMPHETAMINE SULFATE 1.875; 1.875; 1.875; 1.875 MG/1; MG/1; MG/1; MG/1
30 TABLET ORAL ONCE
Refills: 0 | Status: DISCONTINUED | OUTPATIENT
Start: 2023-11-12 | End: 2023-11-12

## 2023-11-12 RX ORDER — DEXTROAMPHETAMINE SACCHARATE, AMPHETAMINE ASPARTATE, DEXTROAMPHETAMINE SULFATE AND AMPHETAMINE SULFATE 1.875; 1.875; 1.875; 1.875 MG/1; MG/1; MG/1; MG/1
1 TABLET ORAL
Qty: 9 | Refills: 0
Start: 2023-11-12 | End: 2023-11-14

## 2023-11-12 RX ADMIN — DEXTROAMPHETAMINE SACCHARATE, AMPHETAMINE ASPARTATE, DEXTROAMPHETAMINE SULFATE AND AMPHETAMINE SULFATE 30 MILLIGRAM(S): 1.875; 1.875; 1.875; 1.875 TABLET ORAL at 13:07

## 2023-11-12 NOTE — ED PROVIDER NOTE - ATTENDING APP SHARED VISIT CONTRIBUTION OF CARE
Katelynn: I performed a face to face bedside interview with patient regarding history of present illness, review of symptoms and past medical history. I completed an independent physical exam.  I have discussed patient's plan of care with advanced care provider.   I agree with note as stated above including HISTORY OF PRESENT ILLNESS, HIV, PAST MEDICAL/SURGICAL/FAMILY/SOCIAL HISTORY, ALLERGIES AND HOME MEDICATIONS, REVIEW OF SYSTEMS, PHYSICAL EXAM, MEDICAL DECISION MAKING and any PROGRESS NOTES during the time I functioned as the attending physician for this patient  unless otherwise noted. My brief assessment is as follows: takes adderall tid, per [facility] filled 30 day supplyu 10/9 and regularly ~30 days without suspicion signs. his pharmacy has been on backorder, states his doctor will send it to another pharmacy if he finds another one that has it. requesting dose here. no other complaints.

## 2023-11-12 NOTE — ED PROVIDER NOTE - NS ED ATTENDING STATEMENT MOD
This was a shared visit with the NOHEMI. I reviewed and verified the documentation and independently performed the documented:

## 2023-11-12 NOTE — ED ADULT NURSE NOTE - OBJECTIVE STATEMENT
pt a&ox3, vss, c/o med refill pt in NAD @ this time, respirations even and unlabored, meds gvn per rx, POC discussed w/ patient, will continue to reassess.

## 2023-11-12 NOTE — ED PROVIDER NOTE - PATIENT PORTAL LINK FT
You can access the FollowMyHealth Patient Portal offered by [facility] by registering at the following website: [email or web address]. By joining [employer]’s FollowMyHealth portal, you will also be able to view your health information using other applications (apps) compatible with our system.

## 2023-11-12 NOTE — ED PROVIDER NOTE - OBJECTIVE STATEMENT
33-year-old male presents to ED for medication refill.  Patient explained that he is currently on Adderall 30 mg 3 times a day and he ran out of his medication.  Patient last Adderall prescription was given September 9 and he has not had a refill.  Patient explained that his physician sent his new medication but the pharmacy medication order is yet to be filled.  Patient states that he had not had a dose in the last 2 days.  Patient denies any other issue at this time.

## 2025-04-09 ENCOUNTER — NON-APPOINTMENT (OUTPATIENT)
Age: 35
End: 2025-04-09

## 2025-06-16 ENCOUNTER — NON-APPOINTMENT (OUTPATIENT)
Age: 35
End: 2025-06-16

## 2025-08-28 ENCOUNTER — NON-APPOINTMENT (OUTPATIENT)
Age: 35
End: 2025-08-28